# Patient Record
Sex: FEMALE | Race: WHITE | NOT HISPANIC OR LATINO | URBAN - METROPOLITAN AREA
[De-identification: names, ages, dates, MRNs, and addresses within clinical notes are randomized per-mention and may not be internally consistent; named-entity substitution may affect disease eponyms.]

---

## 2017-08-23 ENCOUNTER — OFFICE (OUTPATIENT)
Dept: URBAN - METROPOLITAN AREA CLINIC 14 | Facility: CLINIC | Age: 62
End: 2017-08-23
Payer: COMMERCIAL

## 2017-08-23 VITALS
HEIGHT: 64 IN | SYSTOLIC BLOOD PRESSURE: 132 MMHG | HEART RATE: 60 BPM | DIASTOLIC BLOOD PRESSURE: 76 MMHG | WEIGHT: 167 LBS

## 2017-08-23 DIAGNOSIS — K21.9 GASTRO-ESOPHAGEAL REFLUX DISEASE WITHOUT ESOPHAGITIS: ICD-10-CM

## 2017-08-23 DIAGNOSIS — K31.7 POLYP OF STOMACH AND DUODENUM: ICD-10-CM

## 2017-08-23 DIAGNOSIS — E66.3 OVERWEIGHT: ICD-10-CM

## 2017-08-23 DIAGNOSIS — Z83.71 FAMILY HISTORY OF COLONIC POLYPS: ICD-10-CM

## 2017-08-23 DIAGNOSIS — K59.00 CONSTIPATION, UNSPECIFIED: ICD-10-CM

## 2017-08-23 DIAGNOSIS — K30 FUNCTIONAL DYSPEPSIA: ICD-10-CM

## 2017-08-23 PROCEDURE — 99214 OFFICE O/P EST MOD 30 MIN: CPT

## 2017-12-05 ENCOUNTER — ON CAMPUS - OUTPATIENT (OUTPATIENT)
Dept: URBAN - METROPOLITAN AREA HOSPITAL 97 | Facility: HOSPITAL | Age: 62
End: 2017-12-05

## 2017-12-05 DIAGNOSIS — K31.9 DISEASE OF STOMACH AND DUODENUM, UNSPECIFIED: ICD-10-CM

## 2017-12-05 PROCEDURE — 43237 ENDOSCOPIC US EXAM ESOPH: CPT | Performed by: INTERNAL MEDICINE

## 2018-08-28 ENCOUNTER — OFFICE (OUTPATIENT)
Dept: URBAN - METROPOLITAN AREA CLINIC 14 | Facility: CLINIC | Age: 63
End: 2018-08-28
Payer: COMMERCIAL

## 2018-08-28 VITALS
DIASTOLIC BLOOD PRESSURE: 76 MMHG | SYSTOLIC BLOOD PRESSURE: 130 MMHG | RESPIRATION RATE: 16 BRPM | HEART RATE: 65 BPM | HEIGHT: 64 IN | WEIGHT: 168 LBS

## 2018-08-28 DIAGNOSIS — K31.7 POLYP OF STOMACH AND DUODENUM: ICD-10-CM

## 2018-08-28 DIAGNOSIS — K21.9 GASTRO-ESOPHAGEAL REFLUX DISEASE WITHOUT ESOPHAGITIS: ICD-10-CM

## 2018-08-28 DIAGNOSIS — Z83.71 FAMILY HISTORY OF COLONIC POLYPS: ICD-10-CM

## 2018-08-28 DIAGNOSIS — E66.3 OVERWEIGHT: ICD-10-CM

## 2018-08-28 DIAGNOSIS — K59.00 CONSTIPATION, UNSPECIFIED: ICD-10-CM

## 2018-08-28 PROCEDURE — 99213 OFFICE O/P EST LOW 20 MIN: CPT

## 2018-08-28 RX ORDER — OMEPRAZOLE 20 MG/1
20 CAPSULE, DELAYED RELEASE ORAL
Qty: 90 | Refills: 3 | Status: COMPLETED
Start: 2018-08-28 | End: 2018-12-12

## 2018-12-12 ENCOUNTER — OFFICE (OUTPATIENT)
Dept: URBAN - METROPOLITAN AREA CLINIC 14 | Facility: CLINIC | Age: 63
End: 2018-12-12
Payer: COMMERCIAL

## 2018-12-12 VITALS
HEART RATE: 70 BPM | RESPIRATION RATE: 16 BRPM | DIASTOLIC BLOOD PRESSURE: 79 MMHG | HEIGHT: 64 IN | WEIGHT: 166 LBS | SYSTOLIC BLOOD PRESSURE: 127 MMHG

## 2018-12-12 DIAGNOSIS — K21.9 GASTRO-ESOPHAGEAL REFLUX DISEASE WITHOUT ESOPHAGITIS: ICD-10-CM

## 2018-12-12 DIAGNOSIS — Z83.71 FAMILY HISTORY OF COLONIC POLYPS: ICD-10-CM

## 2018-12-12 DIAGNOSIS — K30 FUNCTIONAL DYSPEPSIA: ICD-10-CM

## 2018-12-12 DIAGNOSIS — K31.7 POLYP OF STOMACH AND DUODENUM: ICD-10-CM

## 2018-12-12 DIAGNOSIS — K59.00 CONSTIPATION, UNSPECIFIED: ICD-10-CM

## 2018-12-12 PROCEDURE — 99213 OFFICE O/P EST LOW 20 MIN: CPT

## 2018-12-12 RX ORDER — OMEPRAZOLE 40 MG/1
40 CAPSULE, DELAYED RELEASE ORAL
Qty: 90 | Refills: 3 | Status: COMPLETED
Start: 2018-12-12 | End: 2019-09-17

## 2018-12-12 RX ORDER — OMEPRAZOLE 20 MG/1
20 CAPSULE, DELAYED RELEASE ORAL
Qty: 0 | Refills: 0 | Status: COMPLETED
End: 2018-12-12

## 2019-09-17 ENCOUNTER — OFFICE (OUTPATIENT)
Dept: URBAN - METROPOLITAN AREA CLINIC 14 | Facility: CLINIC | Age: 64
End: 2019-09-17
Payer: COMMERCIAL

## 2019-09-17 VITALS
HEART RATE: 63 BPM | WEIGHT: 174 LBS | HEIGHT: 64 IN | SYSTOLIC BLOOD PRESSURE: 141 MMHG | RESPIRATION RATE: 16 BRPM | DIASTOLIC BLOOD PRESSURE: 82 MMHG

## 2019-09-17 DIAGNOSIS — K31.7 POLYP OF STOMACH AND DUODENUM: ICD-10-CM

## 2019-09-17 DIAGNOSIS — K59.00 CONSTIPATION, UNSPECIFIED: ICD-10-CM

## 2019-09-17 DIAGNOSIS — Z83.71 FAMILY HISTORY OF COLONIC POLYPS: ICD-10-CM

## 2019-09-17 DIAGNOSIS — K21.9 GASTRO-ESOPHAGEAL REFLUX DISEASE WITHOUT ESOPHAGITIS: ICD-10-CM

## 2019-09-17 DIAGNOSIS — K30 FUNCTIONAL DYSPEPSIA: ICD-10-CM

## 2019-09-17 PROCEDURE — 99213 OFFICE O/P EST LOW 20 MIN: CPT

## 2019-09-17 RX ORDER — PANTOPRAZOLE SODIUM 40 MG/1
40 TABLET, DELAYED RELEASE ORAL
Qty: 30 | Refills: 11 | Status: COMPLETED
Start: 2019-09-17 | End: 2019-10-10

## 2019-09-17 RX ORDER — OMEPRAZOLE 40 MG/1
40 CAPSULE, DELAYED RELEASE ORAL
Qty: 90 | Refills: 3 | Status: COMPLETED
Start: 2018-12-12 | End: 2019-09-17

## 2020-02-24 ENCOUNTER — OFFICE (OUTPATIENT)
Dept: URBAN - METROPOLITAN AREA CLINIC 14 | Facility: CLINIC | Age: 65
End: 2020-02-24
Payer: COMMERCIAL

## 2020-02-24 VITALS
HEART RATE: 64 BPM | HEIGHT: 64 IN | SYSTOLIC BLOOD PRESSURE: 141 MMHG | WEIGHT: 167 LBS | DIASTOLIC BLOOD PRESSURE: 75 MMHG

## 2020-02-24 DIAGNOSIS — Z83.71 FAMILY HISTORY OF COLONIC POLYPS: ICD-10-CM

## 2020-02-24 DIAGNOSIS — K59.00 CONSTIPATION, UNSPECIFIED: ICD-10-CM

## 2020-02-24 DIAGNOSIS — K21.9 GASTRO-ESOPHAGEAL REFLUX DISEASE WITHOUT ESOPHAGITIS: ICD-10-CM

## 2020-02-24 DIAGNOSIS — K31.7 POLYP OF STOMACH AND DUODENUM: ICD-10-CM

## 2020-02-24 DIAGNOSIS — R13.19 OTHER DYSPHAGIA: ICD-10-CM

## 2020-02-24 PROCEDURE — 99213 OFFICE O/P EST LOW 20 MIN: CPT

## 2020-02-24 RX ORDER — OMEPRAZOLE 40 MG/1
40 CAPSULE, DELAYED RELEASE ORAL
Qty: 30 | Refills: 11 | Status: COMPLETED
Start: 2019-10-10 | End: 2020-02-24

## 2020-02-24 RX ORDER — DEXLANSOPRAZOLE 60 MG/1
60 CAPSULE, DELAYED RELEASE ORAL
Qty: 90 | Refills: 3 | Status: COMPLETED
Start: 2020-02-24 | End: 2020-09-22

## 2020-09-22 ENCOUNTER — OFFICE (OUTPATIENT)
Dept: URBAN - METROPOLITAN AREA CLINIC 11 | Facility: CLINIC | Age: 65
End: 2020-09-22
Payer: COMMERCIAL

## 2020-09-22 VITALS
OXYGEN SATURATION: 98 % | HEART RATE: 66 BPM | HEIGHT: 64 IN | SYSTOLIC BLOOD PRESSURE: 151 MMHG | DIASTOLIC BLOOD PRESSURE: 83 MMHG | WEIGHT: 171 LBS

## 2020-09-22 DIAGNOSIS — C49.A0 GASTROINTESTINAL STROMAL TUMOR, UNSPECIFIED SITE: ICD-10-CM

## 2020-09-22 DIAGNOSIS — K21.9 GASTRO-ESOPHAGEAL REFLUX DISEASE WITHOUT ESOPHAGITIS: ICD-10-CM

## 2020-09-22 DIAGNOSIS — Z83.71 FAMILY HISTORY OF COLONIC POLYPS: ICD-10-CM

## 2020-09-22 PROCEDURE — 99214 OFFICE O/P EST MOD 30 MIN: CPT | Performed by: INTERNAL MEDICINE

## 2020-11-05 ENCOUNTER — OFFICE (OUTPATIENT)
Dept: URBAN - METROPOLITAN AREA TELEHEALTH 10 | Facility: TELEHEALTH | Age: 65
End: 2020-11-05
Payer: COMMERCIAL

## 2020-11-05 VITALS — HEIGHT: 64 IN

## 2020-11-05 DIAGNOSIS — R11.0 NAUSEA: ICD-10-CM

## 2020-11-05 DIAGNOSIS — C49.A0 GASTROINTESTINAL STROMAL TUMOR, UNSPECIFIED SITE: ICD-10-CM

## 2020-11-05 DIAGNOSIS — K21.9 GASTRO-ESOPHAGEAL REFLUX DISEASE WITHOUT ESOPHAGITIS: ICD-10-CM

## 2020-11-05 RX ORDER — ONDANSETRON 4 MG/1
TABLET, ORALLY DISINTEGRATING ORAL
Qty: 60 | Refills: 3 | Status: COMPLETED
Start: 2020-11-05 | End: 2022-08-16

## 2020-11-05 RX ORDER — RABEPRAZOLE SODIUM 20 MG/1
20 TABLET, DELAYED RELEASE ORAL
Qty: 90 | Refills: 3 | Status: COMPLETED
Start: 2020-11-05 | End: 2021-12-09

## 2020-11-05 NOTE — SERVICEHPINOTES
Mrs. 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 She was a patient of Dr. Sheng Dawson before he left our group. He performed an upper endoscopy at [facility] in November 2015. Her EGD revealed a small hiatal hernia, mild diffuse gastritis, and a ~1.5 cm submucosal nodule in the fundus of the stomach. This lesion was noted to be mobile and somewhat firm and was not felt to represent a lipoma. Dr. Vineet Dawson subsequently proceeded with cholecystectomy. Of note, for further evaluation of her submucosal nodule, she was referred to Dr. Scott for EUS with FNA which was performed on 12/30/15 at which time Dr. Scott noted a 12 mm hypoechoic nodule in the fundus consistent with probable small GIST (FNA was non-diagnostic). After discussing results and options, she agreed to proceed with a repeat EUS in December 2017.Mrs. Leyva presented on 12/9/15 for outpatient follow-up. At that time, she noted constipation. She had been provided with a recommendation for use of MiraLax and then magnesium citrate after discussing her case with Dr. Siddiqui. She was also provided with Linzess 145 mcg samples, though she only took one tablet prior to adding magnesium citrate and then a fleets enema. For her underlying reflux, she had been on omeprazole 20 mg per day for the previous few years after leaving the hospital, she was on 40 mg per day. Mrs. Leyva then returned on 12/7/16 for her scheduled follow-up at which time she was doing quite well. It took her several months to recover from her previous cholecystectomy, though she was doing well. From a reflux standpoint, she remained on omeprazole. She tried to stop her medication on a few occasions, but had significant symptoms within 3-4 days on average. She was interested in converting to H2-blocker therapy in order to avoid potential risks of long-term PPI use, so we converted her to ranitidine on that date (though she later failed H2-blocker therapy and went back to PPI therapy with omeprazole). With respect to her previous constipation history, she was then doing extremely well with only once-daily fiber use.Mrs. Leyva presented on 8/23/17 today with her  for an earlier-than-planned follow-up visit. Her chief complaint surrounded symptoms of dyspepsia. She reported nausea, bloating, and early satiety with associated significant postprandial eructation. Fortunately, her constipation remained well-controlled on fiber. A GES that was performed on 8/28/17 and that returned as normal.Mrs. Leyva returned on 8/28/18 for a one-year follow-up visit. Fortunately, she was doing extremely well at that time. She decreased her omeprazole from 40 mg to 20 mg per day with an occasional requirement for 40 mg dosing. Her constipation remained well-controlled with dietary management alone. Mrs. Leyva then presented on 12/12/18 for an acute care visit due to symptoms of dyspepsia that began approximately 11/30/18 while she was out-of-town. After discussing the timeline involved, she noted that her symptoms worsened/developed after decreasing her omeprazole from 40 to 20 mg. She remained on 81 mg ASA, but denied other NSAD use and denies melena or other problem. At that time, Dr. Dawson advised her to increase her omeprazole back to 40 mg per day and add evening ranitidine 150 mg for any breakthrough symptoms.Mrs. Leyva then returned on 9/17/19 for assistance with nausea. She reported that she was in her usual state of health until approximately 2 months earlier at which time she began to note increased nausea. She was no longer on a daily ASA. She reported only rare NSAID use, often only once per month. She remained on omeprazole 40 mg per day and added Zantac at night as needed. Noting her current symptoms, she was interested in a trial of an alternate PPI at this time. As such, Dr. Jhaveri converted her from omeprazole to pantoprazole.Mrs. Leyva returned on 2/24/2020 for assistance following a December 2019 hospitalization for non-cardiac chest pain that was eventually attributed to possible reflux after negative stress and other testing by Dr. Mitchell. Her chief complaint at this time dyspepsia and dysphagia. She typically has difficulty approximately 2-3 times per week on average. She routinely has nausea as well. She was switched to lansoprazole at that time On follow up 9/22/2020, she was overall doing better. She was taking lansoprazole once each morning. Her main issue was at night with regurgitation after dinner. She had some nausea but very rare vomiting. No weight loss. Overall she felt about as well as she has in the last 5 years. I offered her repeat gastric emptying study, but she wanted to hold off for now.On follow-up today, she is overall doing much worse.  She is having nausea on a daily basis, throughout the day.  She knows that she cannot eat any dairy products such as milk or ice cream.  She has been compliant on lansoprazole, but does not feel like is helping very much.  She is taking Pepcid and Tums and Pepto-Bismol as needed.  She is moving her bowels regularly without any issues.  She continues to have significant regurgitation at night, but no pyrosis.

## 2020-11-05 NOTE — SERVICENOTES
The patient is aware this visit will be billed., The duration of the telehealth visit was 8 minutes and 35 seconds.

## 2021-04-06 ENCOUNTER — ON CAMPUS - OUTPATIENT (OUTPATIENT)
Dept: URBAN - METROPOLITAN AREA HOSPITAL 97 | Facility: HOSPITAL | Age: 66
End: 2021-04-06
Payer: MEDICARE

## 2021-04-06 ENCOUNTER — ON CAMPUS - OUTPATIENT (OUTPATIENT)
Dept: URBAN - METROPOLITAN AREA HOSPITAL 97 | Facility: HOSPITAL | Age: 66
End: 2021-04-06
Payer: COMMERCIAL

## 2021-04-06 DIAGNOSIS — K31.9 DISEASE OF STOMACH AND DUODENUM, UNSPECIFIED: ICD-10-CM

## 2021-04-06 DIAGNOSIS — D37.1 NEOPLASM OF UNCERTAIN BEHAVIOR OF STOMACH: ICD-10-CM

## 2021-04-06 DIAGNOSIS — K21.9 GASTRO-ESOPHAGEAL REFLUX DISEASE WITHOUT ESOPHAGITIS: ICD-10-CM

## 2021-04-06 DIAGNOSIS — C49.A2 GASTROINTESTINAL STROMAL TUMOR OF STOMACH: ICD-10-CM

## 2021-04-06 PROCEDURE — 43237 ENDOSCOPIC US EXAM ESOPH: CPT | Performed by: INTERNAL MEDICINE

## 2021-04-06 PROCEDURE — 43259 EGD US EXAM DUODENUM/JEJUNUM: CPT | Performed by: INTERNAL MEDICINE

## 2021-09-24 ENCOUNTER — OFFICE (OUTPATIENT)
Dept: URBAN - METROPOLITAN AREA CLINIC 14 | Facility: CLINIC | Age: 66
End: 2021-09-24

## 2021-09-24 VITALS
WEIGHT: 170 LBS | SYSTOLIC BLOOD PRESSURE: 124 MMHG | DIASTOLIC BLOOD PRESSURE: 65 MMHG | HEART RATE: 81 BPM | RESPIRATION RATE: 18 BRPM | OXYGEN SATURATION: 97 % | HEIGHT: 64 IN

## 2021-09-24 DIAGNOSIS — K21.9 GASTRO-ESOPHAGEAL REFLUX DISEASE WITHOUT ESOPHAGITIS: ICD-10-CM

## 2021-09-24 DIAGNOSIS — C49.A0 GASTROINTESTINAL STROMAL TUMOR, UNSPECIFIED SITE: ICD-10-CM

## 2021-09-24 PROCEDURE — 99213 OFFICE O/P EST LOW 20 MIN: CPT | Performed by: INTERNAL MEDICINE

## 2021-09-24 RX ORDER — RABEPRAZOLE SODIUM 20 MG/1
20 TABLET, DELAYED RELEASE ORAL
Qty: 90 | Refills: 3 | Status: COMPLETED
Start: 2020-11-05 | End: 2021-12-09

## 2021-12-09 ENCOUNTER — OFFICE (OUTPATIENT)
Dept: URBAN - METROPOLITAN AREA CLINIC 11 | Facility: CLINIC | Age: 66
End: 2021-12-09

## 2021-12-09 VITALS
HEIGHT: 64 IN | SYSTOLIC BLOOD PRESSURE: 148 MMHG | OXYGEN SATURATION: 98 % | HEART RATE: 78 BPM | DIASTOLIC BLOOD PRESSURE: 81 MMHG | WEIGHT: 167 LBS

## 2021-12-09 DIAGNOSIS — R19.4 CHANGE IN BOWEL HABIT: ICD-10-CM

## 2021-12-09 DIAGNOSIS — R11.0 NAUSEA: ICD-10-CM

## 2021-12-09 DIAGNOSIS — K21.9 GASTRO-ESOPHAGEAL REFLUX DISEASE WITHOUT ESOPHAGITIS: ICD-10-CM

## 2021-12-09 LAB
CBC, PLATELET, NO DIFFERENTIAL: HEMATOCRIT: 43.8 % (ref 34–46.6)
CBC, PLATELET, NO DIFFERENTIAL: HEMOGLOBIN: 13.9 G/DL (ref 11.1–15.9)
CBC, PLATELET, NO DIFFERENTIAL: MCH: 28.8 PG (ref 26.6–33)
CBC, PLATELET, NO DIFFERENTIAL: MCHC: 31.7 G/DL (ref 31.5–35.7)
CBC, PLATELET, NO DIFFERENTIAL: MCV: 91 FL (ref 79–97)
CBC, PLATELET, NO DIFFERENTIAL: PLATELETS: 375 X10E3/UL (ref 150–450)
CBC, PLATELET, NO DIFFERENTIAL: RBC: 4.82 X10E6/UL (ref 3.77–5.28)
CBC, PLATELET, NO DIFFERENTIAL: RDW: 12.6 % (ref 11.7–15.4)
CBC, PLATELET, NO DIFFERENTIAL: WBC: 6.6 X10E3/UL (ref 3.4–10.8)
HEPATIC FUNCTION PANEL (7): ALBUMIN: 4.5 G/DL (ref 3.8–4.8)
HEPATIC FUNCTION PANEL (7): ALKALINE PHOSPHATASE: 83 IU/L (ref 44–121)
HEPATIC FUNCTION PANEL (7): ALT (SGPT): 16 IU/L (ref 0–32)
HEPATIC FUNCTION PANEL (7): AST (SGOT): 22 IU/L (ref 0–40)
HEPATIC FUNCTION PANEL (7): BILIRUBIN, DIRECT: 0.11 MG/DL (ref 0–0.4)
HEPATIC FUNCTION PANEL (7): BILIRUBIN, TOTAL: 0.4 MG/DL (ref 0–1.2)
HEPATIC FUNCTION PANEL (7): PROTEIN, TOTAL: 6.8 G/DL (ref 6–8.5)

## 2021-12-09 PROCEDURE — 99214 OFFICE O/P EST MOD 30 MIN: CPT

## 2021-12-09 RX ORDER — RABEPRAZOLE SODIUM 20 MG/1
40 TABLET, DELAYED RELEASE ORAL
Qty: 180 | Refills: 3 | Status: ACTIVE
Start: 2021-12-09

## 2021-12-09 NOTE — SERVICEHPINOTES
Mrs. Ariela Leyva is a 66-year-old  female with a past medical history that includes GERD, cholelithiasis, and an apparent small GIST in the fundus of the stomach. She was a patient of Dr. Sheng Dawson before he left our group. He performed an upper endoscopy at [facility] in 2015. Her EGD revealed a small hiatal hernia, mild diffuse gastritis, and a ~1.5 cm submucosal nodule in the fundus of the stomach. This lesion was noted to be mobile and somewhat firm and was not felt to represent a lipoma. Dr. Vineet Dawson subsequently proceeded with cholecystectomy. Of note, for further evaluation of her submucosal nodule, she was referred to Dr. Scott for EUS with FNA which was performed on 12/30/15 at which time Dr. Scott noted a 12 mm hypoechoic nodule in the fundus consistent with probable small GIST (FNA was non-diagnostic). After discussing results and options, she agreed to proceed with a repeat EUS in 2017.Mrs. Leyva presented on 12/9/15 for outpatient follow-up. At that time, she noted constipation. She had been provided with a recommendation for use of MiraLax and then magnesium citrate after discussing her case with Dr. Siddiqui. She was also provided with Linzess 145 mcg samples, though she only took one tablet prior to adding magnesium citrate and then a fleets enema. For her underlying reflux, she had been on omeprazole 20 mg per day for the previous few years after leaving the hospital, she was on 40 mg per day.Mrs. Leyva then returned on 16 for her scheduled follow-up at which time she was doing quite well. It took her several months to recover from her previous cholecystectomy, though she was doing well. From a reflux standpoint, she remained on omeprazole. She tried to stop her medication on a few occasions, but had significant symptoms within 3-4 days on average. She was interested in converting to H2-blocker therapy in order to avoid potential risks of long-term PPI use, so we converted her to ranitidine on that date (though she later failed H2-blocker therapy and went back to PPI therapy with omeprazole). With respect to her previous constipation history, she was then doing extremely well with only once-daily fiber use.Mrs. Leyva presented on 17 today with her  for an earlier-than-planned follow-up visit. Her chief complaint surrounded symptoms of dyspepsia. She reported nausea, bloating, and early satiety with associated significant postprandial eructation. Fortunately, her constipation remained well-controlled on fiber. A GES that was performed on 17 and that returned as normal.Mrs. Leyva returned on 18 for a one-year follow-up visit. Fortunately, she was doing extremely well at that time. She decreased her omeprazole from 40 mg to 20 mg per day with an occasional requirement for 40 mg dosing. Her constipation remained well-controlled with dietary management alone.Mrs. Leyva then presented on 18 for an acute care visit due to symptoms of dyspepsia that began approximately 18 while she was out-of-town. After discussing the timeline involved, she noted that her symptoms worsened/developed after decreasing her omeprazole from 40 to 20 mg. She remained on 81 mg ASA, but denied other NSAD use and denies melena or other problem. At that time, Dr. Dawson advised her to increase her omeprazole back to 40 mg per day and add evening ranitidine 150 mg for any breakthrough symptoms.Mrs. Leyva then returned on 19 for assistance with nausea. She reported that she was in her usual state of health until approximately 2 months earlier at which time she began to note increased nausea. She was no longer on a daily ASA. She reported only rare NSAID use, often only once per month. She remained on omeprazole 40 mg per day and added Zantac at night as needed. Noting her current symptoms, she was interested in a trial of an alternate PPI at this time. As such, Dr. Jhaveri converted her from omeprazole to pantoprazole.Mrs. Leyva returned on 2020 for assistance following a 2019 hospitalization for non-cardiac chest pain that was eventually attributed to possible reflux after negative stress and other testing by Dr. Mitchell. Her chief complaint at this time dyspepsia and dysphagia. She typically has difficulty approximately 2-3 times per week on average. She routinely has nausea as well. She was switched to lansoprazole at that timeOn follow up 2020, she was overall doing better. She was taking lansoprazole once each morning. Her main issue was at night with regurgitation after dinner. She had some nausea but very rare vomiting. No weight loss. Overall she felt about as well as she has in the last 5 years. I offered her repeat gastric emptying study, but she wanted to hold off for now.On follow-up 20 she was overall doing much worse. She was having nausea on a daily basis, throughout the day. She had been compliant on lansoprazole, but did not feel like it helped her very much. She was moving her bowels regularly without any issues. She continued to have significant regurgitation at night, but no pyrosis. I recommended switching to rabeprazole at that time.On follow-up 21, she was overall doing better. She knows that if she eats late at night, or has ice cream, sweet tea etc she will have worsening GERD. She is not having much abdominal pain. No significant nausea. No weight loss.
br
br She presents for sooner follow-up today after having a new type of abdominal pain and fever. I am seeing her for the first time today.  she states she recently had an episode of burning suprapubic pain which occurred after drinking coffee.   She states she also had a fever to100.6 that lasted a few days.?   She denied any associated urinary symptoms.  The fever has since resolved and the abdominal pain mostly improved.  She continues to have the symptoms for which she has been seen in the past.  She states if she eats to feeling full she will have epigastric discomfort and states it feels like food just sits there. At night, if she leans over she will have regurgitation despite having 3 hours in between dinner and bedtime. When she lays down she states she cannot breath and will need to vomit, and can vomit either liquid or sometimes undigested food.  She states she will feel nauseous after waking up and it is persistent at a low level all day.  She frequently has burping after eating.  She is currently on lansoprazole 30 mg and states the AcipHex worked better but did not fully resolve symptoms. She has a lot of stress and thinks about her  son often.

## 2021-12-09 NOTE — SERVICENOTES
I recommended we switch her back to AcipHex as it seems to work better than the prevacid and will start her on BID dosing for 2-3 months, and then decrease to once daily dosing.  recommended a gastric emptying study as well in light of her frequent nausea and vomiting episodes and epigastric fullness. It was previously normal in 2017. I reviewed her recent EGD.

## 2022-08-16 ENCOUNTER — OFFICE (OUTPATIENT)
Dept: URBAN - METROPOLITAN AREA CLINIC 11 | Facility: CLINIC | Age: 67
End: 2022-08-16

## 2022-08-16 VITALS
SYSTOLIC BLOOD PRESSURE: 136 MMHG | HEIGHT: 64 IN | HEART RATE: 65 BPM | WEIGHT: 167 LBS | DIASTOLIC BLOOD PRESSURE: 76 MMHG | OXYGEN SATURATION: 95 %

## 2022-08-16 DIAGNOSIS — K21.9 GASTRO-ESOPHAGEAL REFLUX DISEASE WITHOUT ESOPHAGITIS: ICD-10-CM

## 2022-08-16 DIAGNOSIS — Z83.71 FAMILY HISTORY OF COLONIC POLYPS: ICD-10-CM

## 2022-08-16 DIAGNOSIS — C49.A0 GASTROINTESTINAL STROMAL TUMOR, UNSPECIFIED SITE: ICD-10-CM

## 2022-08-16 PROCEDURE — 99213 OFFICE O/P EST LOW 20 MIN: CPT | Performed by: INTERNAL MEDICINE

## 2022-08-16 RX ORDER — RABEPRAZOLE SODIUM 20 MG/1
40 TABLET, DELAYED RELEASE ORAL
Qty: 180 | Refills: 3 | Status: ACTIVE
Start: 2021-12-09

## 2023-08-18 ENCOUNTER — OFFICE (OUTPATIENT)
Dept: URBAN - METROPOLITAN AREA CLINIC 14 | Facility: CLINIC | Age: 68
End: 2023-08-18

## 2023-08-18 VITALS
SYSTOLIC BLOOD PRESSURE: 113 MMHG | WEIGHT: 167 LBS | DIASTOLIC BLOOD PRESSURE: 68 MMHG | OXYGEN SATURATION: 97 % | HEART RATE: 84 BPM | HEIGHT: 64 IN

## 2023-08-18 DIAGNOSIS — K21.9 GASTRO-ESOPHAGEAL REFLUX DISEASE WITHOUT ESOPHAGITIS: ICD-10-CM

## 2023-08-18 DIAGNOSIS — C49.A0 GASTROINTESTINAL STROMAL TUMOR, UNSPECIFIED SITE: ICD-10-CM

## 2023-08-18 DIAGNOSIS — Z83.71 FAMILY HISTORY OF COLONIC POLYPS: ICD-10-CM

## 2023-08-18 PROCEDURE — 99213 OFFICE O/P EST LOW 20 MIN: CPT | Performed by: INTERNAL MEDICINE

## 2023-09-21 ENCOUNTER — AMBULATORY SURGICAL CENTER (OUTPATIENT)
Dept: URBAN - METROPOLITAN AREA SURGERY 3 | Facility: SURGERY | Age: 68
End: 2023-09-21
Payer: COMMERCIAL

## 2023-09-21 ENCOUNTER — AMBULATORY SURGICAL CENTER (OUTPATIENT)
Dept: URBAN - METROPOLITAN AREA SURGERY 3 | Facility: SURGERY | Age: 68
End: 2023-09-21

## 2023-09-21 ENCOUNTER — OFFICE (OUTPATIENT)
Dept: URBAN - METROPOLITAN AREA PATHOLOGY 12 | Facility: PATHOLOGY | Age: 68
End: 2023-09-21

## 2023-09-21 VITALS
DIASTOLIC BLOOD PRESSURE: 60 MMHG | HEART RATE: 53 BPM | OXYGEN SATURATION: 98 % | SYSTOLIC BLOOD PRESSURE: 107 MMHG | RESPIRATION RATE: 19 BRPM | DIASTOLIC BLOOD PRESSURE: 62 MMHG | DIASTOLIC BLOOD PRESSURE: 55 MMHG | HEART RATE: 53 BPM | SYSTOLIC BLOOD PRESSURE: 94 MMHG | WEIGHT: 163 LBS | RESPIRATION RATE: 18 BRPM | HEART RATE: 54 BPM | TEMPERATURE: 97.3 F | OXYGEN SATURATION: 97 % | OXYGEN SATURATION: 97 % | WEIGHT: 163 LBS | HEIGHT: 64 IN | HEART RATE: 59 BPM | DIASTOLIC BLOOD PRESSURE: 72 MMHG | SYSTOLIC BLOOD PRESSURE: 104 MMHG | SYSTOLIC BLOOD PRESSURE: 137 MMHG | HEART RATE: 59 BPM | DIASTOLIC BLOOD PRESSURE: 62 MMHG | OXYGEN SATURATION: 97 % | WEIGHT: 163 LBS | SYSTOLIC BLOOD PRESSURE: 107 MMHG | HEART RATE: 60 BPM | TEMPERATURE: 98.2 F | RESPIRATION RATE: 19 BRPM | SYSTOLIC BLOOD PRESSURE: 94 MMHG | RESPIRATION RATE: 16 BRPM | HEART RATE: 66 BPM | HEART RATE: 54 BPM | RESPIRATION RATE: 18 BRPM | HEART RATE: 60 BPM | RESPIRATION RATE: 16 BRPM | SYSTOLIC BLOOD PRESSURE: 100 MMHG | SYSTOLIC BLOOD PRESSURE: 104 MMHG | SYSTOLIC BLOOD PRESSURE: 137 MMHG | HEART RATE: 66 BPM | HEIGHT: 64 IN | DIASTOLIC BLOOD PRESSURE: 55 MMHG | SYSTOLIC BLOOD PRESSURE: 137 MMHG | HEART RATE: 59 BPM | DIASTOLIC BLOOD PRESSURE: 60 MMHG | HEART RATE: 66 BPM | DIASTOLIC BLOOD PRESSURE: 60 MMHG | TEMPERATURE: 97.3 F | HEART RATE: 60 BPM | OXYGEN SATURATION: 98 % | RESPIRATION RATE: 16 BRPM | RESPIRATION RATE: 19 BRPM | TEMPERATURE: 97.3 F | SYSTOLIC BLOOD PRESSURE: 100 MMHG | HEIGHT: 64 IN | DIASTOLIC BLOOD PRESSURE: 62 MMHG | DIASTOLIC BLOOD PRESSURE: 72 MMHG | DIASTOLIC BLOOD PRESSURE: 55 MMHG | TEMPERATURE: 98.2 F | OXYGEN SATURATION: 98 % | TEMPERATURE: 98.2 F | RESPIRATION RATE: 18 BRPM | SYSTOLIC BLOOD PRESSURE: 104 MMHG | SYSTOLIC BLOOD PRESSURE: 107 MMHG | DIASTOLIC BLOOD PRESSURE: 72 MMHG | SYSTOLIC BLOOD PRESSURE: 100 MMHG | SYSTOLIC BLOOD PRESSURE: 94 MMHG | HEART RATE: 54 BPM | HEART RATE: 53 BPM

## 2023-09-21 DIAGNOSIS — K31.7 POLYP OF STOMACH AND DUODENUM: ICD-10-CM

## 2023-09-21 DIAGNOSIS — Z12.11 ENCOUNTER FOR SCREENING FOR MALIGNANT NEOPLASM OF COLON: ICD-10-CM

## 2023-09-21 DIAGNOSIS — K63.5 POLYP OF COLON: ICD-10-CM

## 2023-09-21 DIAGNOSIS — C49.A0 GASTROINTESTINAL STROMAL TUMOR, UNSPECIFIED SITE: ICD-10-CM

## 2023-09-21 DIAGNOSIS — K29.50 UNSPECIFIED CHRONIC GASTRITIS WITHOUT BLEEDING: ICD-10-CM

## 2023-09-21 DIAGNOSIS — K57.30 DIVERTICULOSIS OF LARGE INTESTINE WITHOUT PERFORATION OR ABS: ICD-10-CM

## 2023-09-21 DIAGNOSIS — K31.89 OTHER DISEASES OF STOMACH AND DUODENUM: ICD-10-CM

## 2023-09-21 PROCEDURE — 45380 COLONOSCOPY AND BIOPSY: CPT | Mod: PT | Performed by: INTERNAL MEDICINE

## 2023-09-21 PROCEDURE — 43239 EGD BIOPSY SINGLE/MULTIPLE: CPT | Mod: 51 | Performed by: INTERNAL MEDICINE

## 2023-09-21 PROCEDURE — 88305 TISSUE EXAM BY PATHOLOGIST: CPT | Performed by: PATHOLOGY

## 2023-09-21 PROCEDURE — 88313 SPECIAL STAINS GROUP 2: CPT | Performed by: PATHOLOGY

## 2023-09-21 PROCEDURE — 88342 IMHCHEM/IMCYTCHM 1ST ANTB: CPT | Performed by: PATHOLOGY

## 2023-09-21 NOTE — SERVICEHPINOTES
Mrs. Leyva is a 67 yo WF here for EGD to survey a known small GIST in the fundus and colonoscopy for screening purposes.

## 2023-09-21 NOTE — SERVICEHPINOTES
Mrs. Leyva is a 69 yo WF here for EGD to survey a known small GIST in the fundus and colonoscopy for screening purposes.

## 2023-09-28 LAB
GASTRO ONE PATHOLOGY: PDF REPORT: (no result)